# Patient Record
(demographics unavailable — no encounter records)

---

## 2025-02-12 NOTE — ASSESSMENT
[FreeTextEntry1] : 60 y.o. female with h/o Hashimoto's disease, vitamin D def and hyperlipidemia.  1. Hashimoto's disease- Patient is euthyroid. Will continue Levothyroxine 50 mcg daily.   2. Vitamin D def- Normal 25 vitamin D level and will continue Nutrafol.     3. Hyperlipidemia- Suspect related to menopause and family history. Encouraged a low-fat diet and exercise. Will monitor for now but nay need to consider starting statin the future. Would consider preventive cardiology evaluation.   Follow up in 6 months.

## 2025-02-12 NOTE — DATA REVIEWED
[FreeTextEntry1] : Labs\par  9/8/2020\par  TSH 3.82 Free T4 1.4\par   Tg ab 708\par  25 vitamin D 32.2\par  \par  7/16/2020\par  Hba1c 4.7%\par  BUN/cr 14/0.67\par  TSH 6.73\par  glucose 95\par  chol 176 HDL 61 LDL 98 tri 89

## 2025-02-12 NOTE — HISTORY OF PRESENT ILLNESS
[FreeTextEntry1] : 60 y.o. female with h/o Hashimoto's disease diagnosed in 2019 on routine lab work by PCP presents for follow up visit.   She tested for positive COVID-19 yesterday after returning from her trip to [facility].   She reports having COVID-19 on 12/27/22 with severe fatigue and now better.   Currently, c/o fatigue and body aches secondary to COVID-19.   C/o dry skin but stable. No other complaints including hair loss, constipation, or cold intolerance. Does get cold hands which is chronic. Weight is stable since last visit. Reports exercise.   Taking LT4 50 mcg daily since October 2021 on an empty stomach.   She is taking Nutrafol daily for hair loss.   No neck complaints including dysphagia or dysphonia or neck tenderness.   Thyroid ultrasound in July 2020 shows mildly prominent gland with heterogenous echotexture without evidence of discrete nodules. No abnormal lymph nodes.    Both of her daughters have Hashimoto's disease. One daughter is being treated with Synthroid. Her sister has subclinical hypothyroidism and thyroid nodules being treated with T4. No family history of thyroid cancer.    In regard to vitamin D def, not consistent with supplement but takes Nutrafol.

## 2025-02-12 NOTE — REASON FOR VISIT
[Follow - Up] : a follow-up visit [Other___] : [unfilled] [Home] : at home, [unfilled] , at the time of the visit. [Medical Office: ([facility])___] : at the medical office located in  [Patient] : the patient

## 2025-02-12 NOTE — REVIEW OF SYSTEMS
[Fatigue] : fatigue [Negative] : Heme/Lymph [Recent Weight Gain (___ Lbs)] : no recent weight gain [Recent Weight Loss (___ Lbs)] : no recent weight loss [Cold Intolerance] : no cold intolerance [FreeTextEntry9] : body aches

## 2025-02-12 NOTE — PAST MEDICAL HISTORY
[Menstruating] : The patient is menstruating [Irregular Cycle Intervals] : are  irregular [Total Preg ___] : G[unfilled] [Live Births ___] : P[unfilled]

## 2025-03-28 NOTE — PHYSICAL EXAM
[Well Developed] : well developed [Well Nourished] : well nourished [No Acute Distress] : no acute distress [Normal Conjunctiva] : normal conjunctiva [Normal Venous Pressure] : normal venous pressure [No Carotid Bruit] : no carotid bruit [Normal S1, S2] : normal S1, S2 [No Rub] : no rub [No Gallop] : no gallop [Clear Lung Fields] : clear lung fields [Good Air Entry] : good air entry [No Respiratory Distress] : no respiratory distress  [Soft] : abdomen soft [Non Tender] : non-tender [No Masses/organomegaly] : no masses/organomegaly [Normal Bowel Sounds] : normal bowel sounds [Normal Gait] : normal gait [No Edema] : no edema [No Cyanosis] : no cyanosis [No Clubbing] : no clubbing [No Varicosities] : no varicosities [No Rash] : no rash [No Skin Lesions] : no skin lesions [Moves all extremities] : moves all extremities [No Focal Deficits] : no focal deficits [Normal Speech] : normal speech [Alert and Oriented] : alert and oriented [Normal memory] : normal memory [de-identified] : +I/VI Murmur

## 2025-03-28 NOTE — DISCUSSION/SUMMARY
[FreeTextEntry1] : In summary   Kelechi, 61 year old with a past medical history of Hyperlipidemia here for preventative cardiology  =============== =============== Hyperlipidemia - CT Calcium Score - Borderline risk   Mn Systolic Murmur (holosystolic) I.VI apical c/f MR AND ARCE New - TTE      Isis, kind thanks for the referral.   Yan Garcia MD [facility] LUIS BEARD Director, Preventive Cardiology [facility]                                             Cholesterol management - Assessed - Impression is active; changes as per above - Discussed diet and exercise at length - Any lifestyle/medication changes as per above BP - Assessed - Impression is active Risk factors for cardiomyopathy - Assessed - Impression is stable Cardiac Health optimization - Discussed diet and exercise at length - Discussed importance of monitoring and re-assessment of cardiac health on further visits                                                                                                                                                                                                                             --                                                                                                                                                                                                                                                                                                                                                                                                                                                                                       ----------- 23 minutes was provided for preventive counseling on healthy diet, weight maintenance, CV risk reduction. Specifically, and separate from other cardiovascular evaluation and treatment, we discussed h willingness to focus  on lifestyle changes, particularly dietary; including greater consumption of vegetables, fruits over saturated fats. We discussed appropriate follow up to monitor progress on these areas.     -                                                                                                                                                                                                                                                                                            -----------  [EKG obtained to assist in diagnosis and management of assessed problem(s)] : EKG obtained to assist in diagnosis and management of assessed problem(s)

## 2025-03-28 NOTE — HISTORY OF PRESENT ILLNESS
[FreeTextEntry1] : Dear Isis,   I had the pleasure of seeing your patient MODESTO IBARRA for Cardiometabolic evaluation.   As you know, she  is a Pleasant, 61 year old with a past medical history of Hyperlipidemia here for preventative cardiology  =============== =============== Hyperlipidemia - CT Calcium Score - Borderline risk   Mn Systolic Murmur (holosystolic) I.VI apical c/f MR AND ARCE New - TTE    CC: Heart Issues - Patient reports no chest pain, no localization to the sternum, no radiation to the neck/jaw, no alleviating nor worsening precipitants to CP, no assoc symptoms to CP - Patient notes no associated SOB/Palps/Leg swelling - Reports No associated F/C/N/V/Headaches - Reports Normal Exercise Tolerance - Reports no medication changes - Reports normal mood/quality of life - Reports no associated midnight awakenings from cP - Reports no diet changes - Reports no associated body aches - Reports no recent colds/viruses - Recent labs/imaging reviewed - Relevant Family history reviewed Mother/Father - CV Risk Assessment for 10 Year ACC/AHA Pooled Risk Cohort Equation places this person at < 7.5% Risk of ASCVD

## 2025-03-28 NOTE — PHYSICAL EXAM
[Well Developed] : well developed [Well Nourished] : well nourished [No Acute Distress] : no acute distress [Normal Conjunctiva] : normal conjunctiva [Normal Venous Pressure] : normal venous pressure [No Carotid Bruit] : no carotid bruit [Normal S1, S2] : normal S1, S2 [No Rub] : no rub [No Gallop] : no gallop [Clear Lung Fields] : clear lung fields [Good Air Entry] : good air entry [No Respiratory Distress] : no respiratory distress  [Soft] : abdomen soft [Non Tender] : non-tender [No Masses/organomegaly] : no masses/organomegaly [Normal Bowel Sounds] : normal bowel sounds [Normal Gait] : normal gait [No Edema] : no edema [No Cyanosis] : no cyanosis [No Clubbing] : no clubbing [No Varicosities] : no varicosities [No Rash] : no rash [No Skin Lesions] : no skin lesions [Moves all extremities] : moves all extremities [No Focal Deficits] : no focal deficits [Normal Speech] : normal speech [Alert and Oriented] : alert and oriented [Normal memory] : normal memory [de-identified] : +I/VI Murmur

## 2025-03-28 NOTE — DISCUSSION/SUMMARY
[FreeTextEntry1] : In summary   Kelechi, 61 year old with a past medical history of Hyperlipidemia here for preventative cardiology  =============== =============== Hyperlipidemia - CT Calcium Score - Borderline risk   Mn Systolic Murmur (holosystolic) I.VI apical c/f MR AND ARCE New - TTE      Isis, kind thanks for the referral.   Yan Garcia MD [facility] LUIS BEARD Director, Preventive Cardiology [facility]                                             Cholesterol management - Assessed - Impression is active; changes as per above - Discussed diet and exercise at length - Any lifestyle/medication changes as per above BP - Assessed - Impression is active Risk factors for cardiomyopathy - Assessed - Impression is stable Cardiac Health optimization - Discussed diet and exercise at length - Discussed importance of monitoring and re-assessment of cardiac health on further visits                                                                                                                                                                                                                             --                                                                                                                                                                                                                                                                                                                                                                                                                                                                                       ----------- 23 minutes was provided for preventive counseling on healthy diet, weight maintenance, CV risk reduction. Specifically, and separate from other cardiovascular evaluation and treatment, we discussed h willingness to focus  on lifestyle changes, particularly dietary; including greater consumption of vegetables, fruits over saturated fats. We discussed appropriate follow up to monitor progress on these areas.     -                                                                                                                                                                                                                                                                                            -----------  [EKG obtained to assist in diagnosis and management of assessed problem(s)] : EKG obtained to assist in diagnosis and management of assessed problem(s)

## 2025-04-02 NOTE — HISTORY OF PRESENT ILLNESS
[FreeTextEntry1] : Annual Physical [de-identified] : MODESTO IBARRA is a 60 yo woman with Hashimoto's disease, solitary kidney here for a physical.  She has been well overall.  Exercises regularly.   Will be seeing dr jaramillo for kidney cyst.  Seeing dr juarez, CT calcium and echo planned  Gyn utd with Dr Tesfaye. Mammogram, u/s utd 10/24.  Colonoscopy utd with dr zion diehl 5/21.  Derm utd with Dr suarez.  Pt did have the flu shot and covid 19 booster.    The patient is  with 3 children.  Her daughter is at [employer].  Her daughter received the pt's right kidney many years ago.  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  The patient is doing well.  She walks for exercise without difficulty.  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

## 2025-04-02 NOTE — HEALTH RISK ASSESSMENT
[Good] : ~his/her~  mood as  good [No] : In the past 12 months have you used drugs other than those required for medical reasons? No [No falls in past year] : Patient reported no falls in the past year [Never] : Never [Patient reported colonoscopy was normal] : Patient reported colonoscopy was normal [None] : None [With Family] : lives with family [Employed] : employed [] :  [Feels Safe at Home] : Feels safe at home [Fully functional (bathing, dressing, toileting, transferring, walking, feeding)] : Fully functional (bathing, dressing, toileting, transferring, walking, feeding) [Fully functional (using the telephone, shopping, preparing meals, housekeeping, doing laundry, using] : Fully functional and needs no help or supervision to perform IADLs (using the telephone, shopping, preparing meals, housekeeping, doing laundry, using transportation, managing medications and managing finances) [Smoke Detector] : smoke detector [Carbon Monoxide Detector] : carbon monoxide detector [Seat Belt] :  uses seat belt [de-identified] : active [de-identified] : regular [Reports changes in hearing] : Reports no changes in hearing [Reports changes in vision] : Reports no changes in vision [Reports changes in dental health] : Reports no changes in dental health [ColonoscopyDate] : 5/21

## 2025-04-02 NOTE — ASSESSMENT
[Vaccines Reviewed] : Immunizations reviewed today. Please see immunization details in the vaccine log within the immunization flowsheet.  [FreeTextEntry1] : HCM Labs reviewed with pt today Gyn utd with Dr Aj yearly Also sees Dr Blake for history of dermoid cyst removal from ovary Mammogram utd 10/24 Bone density utd osteopenia Colonoscopy utd 5/21 Derm utd Dr Laws Tdap utd f/u prn or 1 year

## 2025-06-19 NOTE — REASON FOR VISIT
[Initial Visit] : an initial visit for [Foot Pain] : foot pain [Onychomycosis] : onychomycosis Unable to assess due to medical condition

## 2025-06-26 NOTE — ASSESSMENT
[FreeTextEntry1] : Impression: Hammertoe deformities right and left foot. Exostosis right and left foot. Pain.  Treatment: Discussed findings and conditions with the patient. Discussed conservative vs. surgical intervention with the patient for both the dorsal exostosis and toes of the right foot. Conservative treatment for the midfoot to consistent with insoles to control the midtarsal joint mobility.,I recommended Powerstep 3/4 length insole for her shoes , avoid straps to the area which will exacerbate pressure to the dorsal aspect of the foot at the areas of exostosis which may cause additional pain and irritation to the area. I also recommended Oofos for the house which have more help with control of the midfoot as well and decrease the ROM at the midtarsal joint to assess if the discomfort and pain in the area is from the bump pain or affiliated with arthritic changes within the midtarsal joint itself. If conservative measures failed, possible Exostectomy for bump pain , pending symptoms and radiographs. In regard to the hammertoes, I recommended shoe gear modification such as Aerosoles or shoes with a rounder, higher toe box to accommodate her hammertoe deformities. I recommend avoiding ballerina type shoes, the current shoes she has as they cause additional pressure and contracture to the toes themselves. Discussed p shoe gear modifications with shoemaker, ROM exercises to help with the ROM at the midtarsal joints.  We also discussed possible surgical intervention such as an arthroplasty or arthrodesis with possible fixation if conservative measures fail, along with likely postop course which  may consist of anywhere between 4 to 6 weeks. There would be no driving with surgical shoe. Discussed possible risks such as infection, swelling as well with limited activities. Discussed if patient is considering surgical intervention a surgical consultation can be scheduled. She will contact the office if continued pain and regarding any surgical intervention for the hammertoes and possible exostosis as well.  X-rays were deferred at this time. If patient is to consider surgical intervention, radiographs will be taken at that time. Any pain, issues or questions or concerns she is to contact the office.

## 2025-06-26 NOTE — PHYSICAL EXAM
[2+] : left foot dorsalis pedis 2+ [FreeTextEntry3] : Temperature gradient cooler distally. Negative pitting edema. Negative varicosities visualized distally. Negative necrosis or signs of acute ischemia. CFT: 3 seconds x10. [de-identified] : There is shorter first ray bilaterally. There is a forefoot varus which is fully compensated with some hypermobility at the midtarsal joint bilaterally. There is a dorsal exostosis noted at the dorsal medial aspect of the foot at the MTJ bilaterally, right greater than left. Negative effusion. Mild tenderness on palpation right greater than left but negative pain on ROM of the MTJ. There is negative effusion. Negative calor. Negative signs of bursitis or inflammation of the MTJ's bilaterally. There are hammertoe deformities 2 to 4 bilaterally. The 2nd and 3rd of the right foot are semi-rigid The 4th is flexible on the right. There is semi-rigid contracture of the 2nd and 3rd on the left as well and flexible of the 4th on the left. There is negative pain on palpation of the lesser MPJ's bilaterally. There is mild tenderness on palpation of the dorsal PIPJ but negative effusion. Negative pain on palpation on the 3rd digit or on the left digit hammertoes.  [FreeTextEntry1] : Epicritic sensation and light touch are intact.

## 2025-06-26 NOTE — PHYSICAL EXAM
[2+] : left foot dorsalis pedis 2+ [FreeTextEntry3] : Temperature gradient cooler distally. Negative pitting edema. Negative varicosities visualized distally. Negative necrosis or signs of acute ischemia. CFT: 3 seconds x10. [de-identified] : There is shorter first ray bilaterally. There is a forefoot varus which is fully compensated with some hypermobility at the midtarsal joint bilaterally. There is a dorsal exostosis noted at the dorsal medial aspect of the foot at the MTJ bilaterally, right greater than left. Negative effusion. Mild tenderness on palpation right greater than left but negative pain on ROM of the MTJ. There is negative effusion. Negative calor. Negative signs of bursitis or inflammation of the MTJ's bilaterally. There are hammertoe deformities 2 to 4 bilaterally. The 2nd and 3rd of the right foot are semi-rigid The 4th is flexible on the right. There is semi-rigid contracture of the 2nd and 3rd on the left as well and flexible of the 4th on the left. There is negative pain on palpation of the lesser MPJ's bilaterally. There is mild tenderness on palpation of the dorsal PIPJ but negative effusion. Negative pain on palpation on the 3rd digit or on the left digit hammertoes.  [FreeTextEntry1] : Epicritic sensation and light touch are intact.

## 2025-06-26 NOTE — HISTORY OF PRESENT ILLNESS
[FreeTextEntry1] : Patient presents today with complaint of pain on the dorsal aspect of the right foot greater than left along with hammertoe deformities and tenderness in the 2nd toe of the right foot greater than left. Patient presents wearing a narrow, shallow type shoe. She is on her feet throughout the day. She states the shoes she wears are comfortable for the cushioning and she usually wears a ballerina type shoe. She has tried recently Hoka type slippers for the house, which have a little more arch support and cushioning. She states that the discomfort and pain started more so recently.

## 2025-06-26 NOTE — PHYSICAL EXAM
[2+] : left foot dorsalis pedis 2+ [FreeTextEntry3] : Temperature gradient cooler distally. Negative pitting edema. Negative varicosities visualized distally. Negative necrosis or signs of acute ischemia. CFT: 3 seconds x10. [de-identified] : There is shorter first ray bilaterally. There is a forefoot varus which is fully compensated with some hypermobility at the midtarsal joint bilaterally. There is a dorsal exostosis noted at the dorsal medial aspect of the foot at the MTJ bilaterally, right greater than left. Negative effusion. Mild tenderness on palpation right greater than left but negative pain on ROM of the MTJ. There is negative effusion. Negative calor. Negative signs of bursitis or inflammation of the MTJ's bilaterally. There are hammertoe deformities 2 to 4 bilaterally. The 2nd and 3rd of the right foot are semi-rigid The 4th is flexible on the right. There is semi-rigid contracture of the 2nd and 3rd on the left as well and flexible of the 4th on the left. There is negative pain on palpation of the lesser MPJ's bilaterally. There is mild tenderness on palpation of the dorsal PIPJ but negative effusion. Negative pain on palpation on the 3rd digit or on the left digit hammertoes.  [FreeTextEntry1] : Epicritic sensation and light touch are intact.

## 2025-07-24 NOTE — HISTORY OF PRESENT ILLNESS
[FreeTextEntry1] : The patient is a 61-year-old female who presents right foot hammertoes which are bothersome to her/midfoot pain.  She is here to have the right foot hammertoes evaluated.

## 2025-07-24 NOTE — PHYSICAL EXAM
[de-identified] : Right foot Physical Examination:  General: Alert and oriented x3.  In no acute distress.  Pleasant in nature with a normal affect.  No apparent respiratory distress.  Erythema, Warmth, Rubor: Negative Swelling: Negative  ROM Ankle: 1. Dorsiflexion: 10 degrees 2. Plantarflexion: 40 degrees 3. Inversion: 30 degrees 4. Eversion: 20 degrees  ROM of digits: Normal  Pes Planus: Negative Pes Cavus: Negative  Bunion: Negative Tailor's Bunion (Bunionette): Negative Hammer Toe Deformity/Deformities: Positive flexible hammertoe deformities 2 through 5.  Tenderness to Palpation:  1. Heel Pain: Negative 2. Midfoot Pain: Positive 3. First MTP Joint: Negative 4. Lis Franc Joint: Negative  Tenderness Metatarsals: 1st MT: Negative 2nd MT: Negative 3rd MT: Negative 4th MT: Negative 5th MT: Negative Base of the 5th MT: Negative  Ligament Pain: 1. Lis Franc Ligament: Negative 2. Plantar Fascia Ligament: Negative  Strength:  5/5 TA/GS/EHL/FHL/EDL/ADD/ABD  Pulses: 2+ DP/PT Pulses  Capillary Refill Toes: <2 seconds  Neuro: Intact motor and sensory throughout  Additional Test: 1. Carr's Squeeze Test: Negative 2. Calcaneal Squeeze Test: Negative [de-identified] : 3 views x-rays right foot reviewed and obtained on 7/24/2025: Midfoot arthritic changes seen.  Hammertoe deformities 2 through 5.

## 2025-07-24 NOTE — DISCUSSION/SUMMARY
[de-identified] : MRI right foot without contrast ordered to evaluate the second toe hammertoe/midfoot.  Budon splints given for hammertoes for her to try.  All questions answered about hammertoe deformities and treatment for hammertoe deformities surgical versus nonsurgical.  Once the MRI is completed, the patient return to office to review the MRI findings.  The patient understood and agreed to the treatment course.